# Patient Record
Sex: MALE | Race: WHITE | Employment: OTHER | URBAN - METROPOLITAN AREA
[De-identification: names, ages, dates, MRNs, and addresses within clinical notes are randomized per-mention and may not be internally consistent; named-entity substitution may affect disease eponyms.]

---

## 2017-11-28 ENCOUNTER — APPOINTMENT (OUTPATIENT)
Dept: RADIOLOGY | Facility: CLINIC | Age: 67
End: 2017-11-28
Payer: MEDICARE

## 2017-11-28 ENCOUNTER — TRANSCRIBE ORDERS (OUTPATIENT)
Dept: RADIOLOGY | Facility: CLINIC | Age: 67
End: 2017-11-28

## 2017-11-28 DIAGNOSIS — M25.552 PAIN IN LEFT HIP: Primary | ICD-10-CM

## 2017-11-28 DIAGNOSIS — M25.552 PAIN IN LEFT HIP: ICD-10-CM

## 2017-11-28 PROCEDURE — 73502 X-RAY EXAM HIP UNI 2-3 VIEWS: CPT

## 2018-10-05 ENCOUNTER — OFFICE VISIT (OUTPATIENT)
Dept: OBGYN CLINIC | Facility: CLINIC | Age: 68
End: 2018-10-05
Payer: MEDICARE

## 2018-10-05 VITALS
SYSTOLIC BLOOD PRESSURE: 130 MMHG | HEART RATE: 61 BPM | DIASTOLIC BLOOD PRESSURE: 82 MMHG | WEIGHT: 184 LBS | HEIGHT: 66 IN | BODY MASS INDEX: 29.57 KG/M2

## 2018-10-05 DIAGNOSIS — M65.331 TRIGGER MIDDLE FINGER OF RIGHT HAND: Primary | ICD-10-CM

## 2018-10-05 DIAGNOSIS — M65.321 TRIGGER FINGER, RIGHT INDEX FINGER: ICD-10-CM

## 2018-10-05 PROCEDURE — 99213 OFFICE O/P EST LOW 20 MIN: CPT | Performed by: ORTHOPAEDIC SURGERY

## 2018-10-05 RX ORDER — VALSARTAN 80 MG/1
TABLET ORAL DAILY
COMMUNITY

## 2018-10-05 NOTE — H&P
CHIEF COMPLAINT:  Chief Complaint   Patient presents with    Right Middle Finger - Pain    Right Index Finger - Pain       SUBJECTIVE:  Henry Case is a 79y o  year old RHD male who presents to the office today for evaluation of his right long finger and right index finger  He notes locking and catching to both his right long and index finger  He is a previous patient of Dr Marla Ann at [facility]  He has had 2 previous CSI into both his right long and right index finger which has not provided him with long term relief in his symptoms  The patient denies any cardiac or pulmonary issues  Denies diabetes  Denies any history of MI, gastric ulcers, kidney or liver issues      PAST MEDICAL HISTORY:  History reviewed  No pertinent past medical history  PAST SURGICAL HISTORY:  Past Surgical History:   Procedure Laterality Date    BACK SURGERY      FOOT SURGERY      HERNIA REPAIR      HIP SURGERY      JOINT REPLACEMENT      KNEE SURGERY         FAMILY HISTORY:  History reviewed  No pertinent family history  SOCIAL HISTORY:  Social History   Substance Use Topics    Smoking status: Never Smoker    Smokeless tobacco: Never Used    Alcohol use No       MEDICATIONS:    Current Outpatient Prescriptions:     Calcium Carb-Cholecalciferol (CALCIUM 1000 + D) 1000-800 MG-UNIT TABS, Take by mouth, Disp: , Rfl:     GLUCOSAMINE-CHONDROITIN DS PO, Take by mouth, Disp: , Rfl:     Multiple Vitamin (MULTI-VITAMIN DAILY PO), Take by mouth, Disp: , Rfl:     Omega-3 Fatty Acids (FISH OIL) 645 MG CAPS, Take by mouth, Disp: , Rfl:     valsartan (DIOVAN) 80 mg tablet, Daily, Disp: , Rfl:     ALLERGIES:  Allergies   Allergen Reactions    Cetirizine Other (See Comments)       REVIEW OF SYSTEMS:  Review of Systems   Constitutional: Negative for chills and fever  HENT: Negative for drooling and sneezing  Eyes: Negative for redness  Respiratory: Negative for cough and wheezing      Gastrointestinal: Negative for nausea and vomiting  Musculoskeletal: Negative for arthralgias, joint swelling and myalgias  Neurological: Negative for weakness and numbness  Psychiatric/Behavioral: Negative for behavioral problems  The patient is not nervous/anxious  VITALS:  Vitals:    10/05/18 1543   BP: 130/82   Pulse: 61       LABS:  HgA1c: No results found for: HGBA1C  BMP: No results found for: GLUCOSE, CALCIUM, NA, K, CO2, CL, BUN, CREATININE    _____________________________________________________  PHYSICAL EXAMINATION:  General: well developed and well nourished, alert, oriented times 3 and appears comfortable  Psychiatric: Normal  HEENT: Trachea Midline, No torticollis   Skin: No masses, erthema, lacerations, fluctation, ulcerations  Neurovascular: Sensation Intact to the Median, Ulnar, Radial Nerve, Motor Intact to the Median, Ulnar, Radial Nerve and Pulses Intact   Cardiac:  Regular rate and rhythm  Pulmonary: No respiratory distress  Lung sounds clear to auscultation  MUSCULOSKELETAL EXAMINATION:    right Index finger:  Positive palpable nodule over the A1 pulley  Positive tenderness to palpation over A1 pulley  Positive catching  Positive clicking  right long finger:  Positive palpable nodule over the A1 pulley  Positive tenderness to palpation over A1 pulley  To swollen test catching  To swollen to test clicking         ___________________________________________________  STUDIES REVIEWED:  No studies reviewed  PROCEDURES PERFORMED:  Procedures  No Procedures performed today    _____________________________________________________  ASSESSMENT/PLAN:    Right long and index finger trigger finger   * Patient elected to proceed with right long and right index finger trigger release   *Surgery medication instructions: You will stop eating and drinking at midnight the night before your surgery, but you may continue to take your normal medications with a small sip of water      In the morning on the day of your surgery, we would like you to take the following medications (as long as you have never been told to avoid Tylenol or NSAIDs like ibuprofen, Naproxen, Aleve, Advil, etc):   Ibuprofen 600mg one tablet by mouth   Tylenol 500mg one tablet by mouth    After surgery, we would like you to take Ibuprofen 600mg one tablet by mouth every 6 hours with food (at breakfast, lunch and dinner)  AND Tylenol 500 mg one tablet by mouth every 6 hours  (at breakfast, lunch and dinner) for 5-7 days after your surgery  Please take these medication EVERYDAY after surgery for 5-7 days, and not just as needed  You can take these medications at the same time  Taking these medications after surgery will limit your need for prescription pain medication  We will also prescribe a narcotic pain medication for a limited time after surgery that you can take as needed for moderate or severe pain  Diagnoses and all orders for this visit:    Trigger middle finger of right hand  -     Case request operating room: RELEASE TRIGGER FINGER right long and index; Standing  -     Case request operating room: RELEASE TRIGGER FINGER right long and index    Trigger finger, right index finger  -     Case request operating room: RELEASE TRIGGER FINGER right long and index; Standing  -     Case request operating room: RELEASE TRIGGER FINGER right long and index    Other orders  -     valsartan (DIOVAN) 80 mg tablet; Daily  -     Omega-3 Fatty Acids (FISH OIL) 645 MG CAPS; Take by mouth  -     Calcium Carb-Cholecalciferol (CALCIUM 1000 + D) 1000-800 MG-UNIT TABS; Take by mouth  -     GLUCOSAMINE-CHONDROITIN DS PO; Take by mouth  -     Multiple Vitamin (MULTI-VITAMIN DAILY PO); Take by mouth  -     Void on call to OR; Standing  -     Insert peripheral IV; Standing  -     Diet NPO; Sips with meds; Standing        Follow Up:  No Follow-up on file        To Do Next Visit:  Re-evaluation of current issue and Sutures out      Operative Discussions:  Trigger Finger Release: The anatomy and physiology of trigger finger was discussed with the patient today in the office  Edema and increased contact pressure within the flexor tendons at the A1 pulley can cause pain, crepitation, and limitation of function  Treatment options include resting MP blocking splints to decrease edema, oral anti-inflammatory medications, home or formal therapy exercises, up to 2 steroid injections or surgical release  While majority of patients do respond to conservative treatment, up to 20% may require surgical release  The patient has elected release of the trigger finger  The patient has elected to undergo a release of the A1 pulley (trigger finger)  A small incision will be made over the palmar aspect of the hand, the tendon sheath holding the flexor tendons will be released  In the postoperative period, light activities are allowed immediately, driving is allowed when narcotic medication has stopped, and the incision may get wet after 2 days  Heavy activities (lifting more than approximately 10 pounds) will be allowed after the follow up appointment in 1-2 weeks  While the pain and discomfort within the wrist typically improves rapidly, some residual discomfort may be present for up to 6 weeks  The nodule that is typically palpable in the palmar aspect of the hand will not be removed, as this would necessitate removal of a portion of the flexor tendon, however the catching, clicking, and locking should resolve  Approximate success rate is 98%  The risks and benefits of the procedure were explained to the patient, which include, but are not limited to: Bleeding, infection, recurrence, pain, scar, damage to tendons, damage to nerves, and damage to blood vessels, need for future surgery and complications related to anesthesia  If bony work is done, risks also include malunion and nonunion    These risks, along with alternative conservative treatment options, and postoperative protocols were voiced back and understood by the patient  All questions were answered to the patient's satisfaction  The patient agrees to comply with a standard postoperative protocol, and is willing to proceed  Education was provided via written and auditory forms  There were no barriers to learning  Written handouts regarding wound care, incision and scar care, and general preoperative information, as well as risks and benefits were provided to the patient      Scribe Attestation    I,:   Mya Malhotra MA am acting as a scribe while in the presence of the attending physician :        I,:   Zoe Mcdonough MD personally performed the services described in this documentation    as scribed in my presence :

## 2018-10-05 NOTE — PATIENT INSTRUCTIONS
Surgery medication instructions: You will stop eating and drinking at midnight the night before your surgery, but you may continue to take your normal medications with a small sip of water  In the morning on the day of your surgery, we would like you to take the following medications (as long as you have never been told to avoid Tylenol or NSAIDs like ibuprofen, Naproxen, Aleve, Advil, etc):   Ibuprofen 600mg one tablet by mouth   Tylenol 500mg one tablet by mouth    After surgery, we would like you to take Ibuprofen 600mg one tablet by mouth every 6 hours with food (at breakfast, lunch and dinner)  AND Tylenol 500 mg one tablet by mouth every 6 hours  (at breakfast, lunch and dinner) for 5-7 days after your surgery  Please take these medication EVERYDAY after surgery for 5-7 days, and not just as needed  You can take these medications at the same time  Taking these medications after surgery will limit your need for prescription pain medication  We will also prescribe a narcotic pain medication for a limited time after surgery that you can take as needed for moderate or severe pain       Please stop supplements 5 days prior to surgery

## 2018-10-05 NOTE — PROGRESS NOTES
CHIEF COMPLAINT:  Chief Complaint   Patient presents with    Right Middle Finger - Pain    Right Index Finger - Pain       SUBJECTIVE:  Henry Case is a 79y o  year old RHD male who presents to the office today for evaluation of his right long finger and right index finger  He notes locking and catching to both his right long and index finger  He is a previous patient of Dr Marla Ann at [address] [place] [address]  He has had 2 previous CSI into both his right long and right index finger which has not provided him with long term relief in his symptoms  The patient denies any cardiac or pulmonary issues  Denies diabetes  Denies any history of MI, gastric ulcers, kidney or liver issues      PAST MEDICAL HISTORY:  History reviewed  No pertinent past medical history  PAST SURGICAL HISTORY:  Past Surgical History:   Procedure Laterality Date    BACK SURGERY      FOOT SURGERY      HERNIA REPAIR      HIP SURGERY      JOINT REPLACEMENT      KNEE SURGERY         FAMILY HISTORY:  History reviewed  No pertinent family history  SOCIAL HISTORY:  Social History   Substance Use Topics    Smoking status: Never Smoker    Smokeless tobacco: Never Used    Alcohol use No       MEDICATIONS:    Current Outpatient Prescriptions:     Calcium Carb-Cholecalciferol (CALCIUM 1000 + D) 1000-800 MG-UNIT TABS, Take by mouth, Disp: , Rfl:     GLUCOSAMINE-CHONDROITIN DS PO, Take by mouth, Disp: , Rfl:     Multiple Vitamin (MULTI-VITAMIN DAILY PO), Take by mouth, Disp: , Rfl:     Omega-3 Fatty Acids (FISH OIL) 645 MG CAPS, Take by mouth, Disp: , Rfl:     valsartan (DIOVAN) 80 mg tablet, Daily, Disp: , Rfl:     ALLERGIES:  Allergies   Allergen Reactions    Cetirizine Other (See Comments)       REVIEW OF SYSTEMS:  Review of Systems   Constitutional: Negative for chills and fever  HENT: Negative for drooling and sneezing  Eyes: Negative for redness  Respiratory: Negative for cough and wheezing      Gastrointestinal: Negative for nausea and vomiting  Musculoskeletal: Negative for arthralgias, joint swelling and myalgias  Neurological: Negative for weakness and numbness  Psychiatric/Behavioral: Negative for behavioral problems  The patient is not nervous/anxious  VITALS:  Vitals:    10/05/18 1543   BP: 130/82   Pulse: 61       LABS:  HgA1c: No results found for: HGBA1C  BMP: No results found for: GLUCOSE, CALCIUM, NA, K, CO2, CL, BUN, CREATININE    _____________________________________________________  PHYSICAL EXAMINATION:  General: well developed and well nourished, alert, oriented times 3 and appears comfortable  Psychiatric: Normal  HEENT: Trachea Midline, No torticollis   Skin: No masses, erthema, lacerations, fluctation, ulcerations  Neurovascular: Sensation Intact to the Median, Ulnar, Radial Nerve, Motor Intact to the Median, Ulnar, Radial Nerve and Pulses Intact   Cardiac:  Regular rate and rhythm  Pulmonary: No respiratory distress  Lung sounds clear to auscultation  MUSCULOSKELETAL EXAMINATION:    right Index finger:  Positive palpable nodule over the A1 pulley  Positive tenderness to palpation over A1 pulley  Positive catching  Positive clicking  right long finger:  Positive palpable nodule over the A1 pulley  Positive tenderness to palpation over A1 pulley  To swollen test catching  To swollen to test clicking         ___________________________________________________  STUDIES REVIEWED:  No studies reviewed  PROCEDURES PERFORMED:  Procedures  No Procedures performed today    _____________________________________________________  ASSESSMENT/PLAN:    Right long and index finger trigger finger   * Patient elected to proceed with right long and right index finger trigger release   *Surgery medication instructions: You will stop eating and drinking at midnight the night before your surgery, but you may continue to take your normal medications with a small sip of water      In the morning on the day of your surgery, we would like you to take the following medications (as long as you have never been told to avoid Tylenol or NSAIDs like ibuprofen, Naproxen, Aleve, Advil, etc):   Ibuprofen 600mg one tablet by mouth   Tylenol 500mg one tablet by mouth    After surgery, we would like you to take Ibuprofen 600mg one tablet by mouth every 6 hours with food (at breakfast, lunch and dinner)  AND Tylenol 500 mg one tablet by mouth every 6 hours  (at breakfast, lunch and dinner) for 5-7 days after your surgery  Please take these medication EVERYDAY after surgery for 5-7 days, and not just as needed  You can take these medications at the same time  Taking these medications after surgery will limit your need for prescription pain medication  We will also prescribe a narcotic pain medication for a limited time after surgery that you can take as needed for moderate or severe pain  Diagnoses and all orders for this visit:    Trigger middle finger of right hand  -     Case request operating room: RELEASE TRIGGER FINGER right long and index; Standing  -     Case request operating room: RELEASE TRIGGER FINGER right long and index    Trigger finger, right index finger  -     Case request operating room: RELEASE TRIGGER FINGER right long and index; Standing  -     Case request operating room: RELEASE TRIGGER FINGER right long and index    Other orders  -     valsartan (DIOVAN) 80 mg tablet; Daily  -     Omega-3 Fatty Acids (FISH OIL) 645 MG CAPS; Take by mouth  -     Calcium Carb-Cholecalciferol (CALCIUM 1000 + D) 1000-800 MG-UNIT TABS; Take by mouth  -     GLUCOSAMINE-CHONDROITIN DS PO; Take by mouth  -     Multiple Vitamin (MULTI-VITAMIN DAILY PO); Take by mouth  -     Void on call to OR; Standing  -     Insert peripheral IV; Standing  -     Diet NPO; Sips with meds; Standing        Follow Up:  No Follow-up on file        To Do Next Visit:  Re-evaluation of current issue and Sutures out      Operative Discussions:  Trigger Finger Release: The anatomy and physiology of trigger finger was discussed with the patient today in the office  Edema and increased contact pressure within the flexor tendons at the A1 pulley can cause pain, crepitation, and limitation of function  Treatment options include resting MP blocking splints to decrease edema, oral anti-inflammatory medications, home or formal therapy exercises, up to 2 steroid injections or surgical release  While majority of patients do respond to conservative treatment, up to 20% may require surgical release  The patient has elected release of the trigger finger  The patient has elected to undergo a release of the A1 pulley (trigger finger)  A small incision will be made over the palmar aspect of the hand, the tendon sheath holding the flexor tendons will be released  In the postoperative period, light activities are allowed immediately, driving is allowed when narcotic medication has stopped, and the incision may get wet after 2 days  Heavy activities (lifting more than approximately 10 pounds) will be allowed after the follow up appointment in 1-2 weeks  While the pain and discomfort within the wrist typically improves rapidly, some residual discomfort may be present for up to 6 weeks  The nodule that is typically palpable in the palmar aspect of the hand will not be removed, as this would necessitate removal of a portion of the flexor tendon, however the catching, clicking, and locking should resolve  Approximate success rate is 98%  The risks and benefits of the procedure were explained to the patient, which include, but are not limited to: Bleeding, infection, recurrence, pain, scar, damage to tendons, damage to nerves, and damage to blood vessels, need for future surgery and complications related to anesthesia  If bony work is done, risks also include malunion and nonunion    These risks, along with alternative conservative treatment options, and postoperative protocols were voiced back and understood by the patient  All questions were answered to the patient's satisfaction  The patient agrees to comply with a standard postoperative protocol, and is willing to proceed  Education was provided via written and auditory forms  There were no barriers to learning  Written handouts regarding wound care, incision and scar care, and general preoperative information, as well as risks and benefits were provided to the patient      Scribe Attestation    I,:   Mya Malhotra MA am acting as a scribe while in the presence of the attending physician :        I,:   Nereida Fortune MD personally performed the services described in this documentation    as scribed in my presence :

## 2018-10-15 RX ORDER — LIDOCAINE HYDROCHLORIDE 10 MG/ML
10 INJECTION, SOLUTION INFILTRATION; PERINEURAL ONCE
Status: CANCELLED | OUTPATIENT
Start: 2018-10-15 | End: 2018-10-15

## 2018-10-17 ENCOUNTER — HOSPITAL ENCOUNTER (OUTPATIENT)
Facility: HOSPITAL | Age: 68
Setting detail: OUTPATIENT SURGERY
Discharge: HOME | End: 2018-10-17
Attending: ORTHOPAEDIC SURGERY | Admitting: ORTHOPAEDIC SURGERY
Payer: MEDICARE

## 2018-10-17 VITALS
TEMPERATURE: 97.4 F | SYSTOLIC BLOOD PRESSURE: 148 MMHG | RESPIRATION RATE: 20 BRPM | OXYGEN SATURATION: 98 % | HEART RATE: 53 BPM | DIASTOLIC BLOOD PRESSURE: 82 MMHG

## 2018-10-17 DIAGNOSIS — M65.331 TRIGGER MIDDLE FINGER OF RIGHT HAND: Primary | ICD-10-CM

## 2018-10-17 DIAGNOSIS — M65.321 TRIGGER FINGER, RIGHT INDEX FINGER: ICD-10-CM

## 2018-10-17 PROCEDURE — 26055 INCISE FINGER TENDON SHEATH: CPT | Performed by: ORTHOPAEDIC SURGERY

## 2018-10-17 RX ORDER — HYDROCODONE BITARTRATE AND ACETAMINOPHEN 5; 325 MG/1; MG/1
1 TABLET ORAL EVERY 6 HOURS PRN
Qty: 10 TABLET | Refills: 0 | Status: SHIPPED | OUTPATIENT
Start: 2018-10-17 | End: 2018-10-25

## 2018-10-17 RX ORDER — LIDOCAINE HYDROCHLORIDE 10 MG/ML
10 INJECTION, SOLUTION EPIDURAL; INFILTRATION; INTRACAUDAL; PERINEURAL ONCE
Status: DISCONTINUED | OUTPATIENT
Start: 2018-10-17 | End: 2018-10-17 | Stop reason: HOSPADM

## 2018-10-17 RX ORDER — MAGNESIUM HYDROXIDE 1200 MG/15ML
LIQUID ORAL AS NEEDED
Status: DISCONTINUED | OUTPATIENT
Start: 2018-10-17 | End: 2018-10-17 | Stop reason: HOSPADM

## 2018-10-17 RX ORDER — LIDOCAINE HYDROCHLORIDE 10 MG/ML
INJECTION, SOLUTION EPIDURAL; INFILTRATION; INTRACAUDAL; PERINEURAL
Status: DISCONTINUED
Start: 2018-10-17 | End: 2018-10-17 | Stop reason: HOSPADM

## 2018-10-17 RX ORDER — LIDOCAINE HYDROCHLORIDE 10 MG/ML
INJECTION, SOLUTION INFILTRATION; PERINEURAL AS NEEDED
Status: DISCONTINUED | OUTPATIENT
Start: 2018-10-17 | End: 2018-10-17 | Stop reason: HOSPADM

## 2018-10-17 RX ORDER — SODIUM BICARBONATE 42 MG/ML
INJECTION, SOLUTION INTRAVENOUS
Status: DISCONTINUED
Start: 2018-10-17 | End: 2018-10-17 | Stop reason: HOSPADM

## 2018-10-17 NOTE — H&P (VIEW-ONLY)
CHIEF COMPLAINT:  Chief Complaint   Patient presents with    Right Middle Finger - Pain    Right Index Finger - Pain       SUBJECTIVE:  Rani Cevallos is a 79y o  year old RHD male who presents to the office today for evaluation of his right long finger and right index finger  He notes locking and catching to both his right long and index finger  He is a previous patient of Dr Valery Menjivar at [facility]  He has had 2 previous CSI into both his right long and right index finger which has not provided him with long term relief in his symptoms  The patient denies any cardiac or pulmonary issues  Denies diabetes  Denies any history of MI, gastric ulcers, kidney or liver issues      PAST MEDICAL HISTORY:  History reviewed  No pertinent past medical history  PAST SURGICAL HISTORY:  Past Surgical History:   Procedure Laterality Date    BACK SURGERY      FOOT SURGERY      HERNIA REPAIR      HIP SURGERY      JOINT REPLACEMENT      KNEE SURGERY         FAMILY HISTORY:  History reviewed  No pertinent family history  SOCIAL HISTORY:  Social History   Substance Use Topics    Smoking status: Never Smoker    Smokeless tobacco: Never Used    Alcohol use No       MEDICATIONS:    Current Outpatient Prescriptions:     Calcium Carb-Cholecalciferol (CALCIUM 1000 + D) 1000-800 MG-UNIT TABS, Take by mouth, Disp: , Rfl:     GLUCOSAMINE-CHONDROITIN DS PO, Take by mouth, Disp: , Rfl:     Multiple Vitamin (MULTI-VITAMIN DAILY PO), Take by mouth, Disp: , Rfl:     Omega-3 Fatty Acids (FISH OIL) 645 MG CAPS, Take by mouth, Disp: , Rfl:     valsartan (DIOVAN) 80 mg tablet, Daily, Disp: , Rfl:     ALLERGIES:  Allergies   Allergen Reactions    Cetirizine Other (See Comments)       REVIEW OF SYSTEMS:  Review of Systems   Constitutional: Negative for chills and fever  HENT: Negative for drooling and sneezing  Eyes: Negative for redness  Respiratory: Negative for cough and wheezing      Gastrointestinal: Negative for nausea and vomiting  Musculoskeletal: Negative for arthralgias, joint swelling and myalgias  Neurological: Negative for weakness and numbness  Psychiatric/Behavioral: Negative for behavioral problems  The patient is not nervous/anxious  VITALS:  Vitals:    10/05/18 1543   BP: 130/82   Pulse: 61       LABS:  HgA1c: No results found for: HGBA1C  BMP: No results found for: GLUCOSE, CALCIUM, NA, K, CO2, CL, BUN, CREATININE    _____________________________________________________  PHYSICAL EXAMINATION:  General: well developed and well nourished, alert, oriented times 3 and appears comfortable  Psychiatric: Normal  HEENT: Trachea Midline, No torticollis   Skin: No masses, erthema, lacerations, fluctation, ulcerations  Neurovascular: Sensation Intact to the Median, Ulnar, Radial Nerve, Motor Intact to the Median, Ulnar, Radial Nerve and Pulses Intact   Cardiac:  Regular rate and rhythm  Pulmonary: No respiratory distress  Lung sounds clear to auscultation  MUSCULOSKELETAL EXAMINATION:    right Index finger:  Positive palpable nodule over the A1 pulley  Positive tenderness to palpation over A1 pulley  Positive catching  Positive clicking  right long finger:  Positive palpable nodule over the A1 pulley  Positive tenderness to palpation over A1 pulley  To swollen test catching  To swollen to test clicking         ___________________________________________________  STUDIES REVIEWED:  No studies reviewed  PROCEDURES PERFORMED:  Procedures  No Procedures performed today    _____________________________________________________  ASSESSMENT/PLAN:    Right long and index finger trigger finger   * Patient elected to proceed with right long and right index finger trigger release   *Surgery medication instructions: You will stop eating and drinking at midnight the night before your surgery, but you may continue to take your normal medications with a small sip of water      In the morning on the day of your surgery, we would like you to take the following medications (as long as you have never been told to avoid Tylenol or NSAIDs like ibuprofen, Naproxen, Aleve, Advil, etc):   Ibuprofen 600mg one tablet by mouth   Tylenol 500mg one tablet by mouth    After surgery, we would like you to take Ibuprofen 600mg one tablet by mouth every 6 hours with food (at breakfast, lunch and dinner)  AND Tylenol 500 mg one tablet by mouth every 6 hours  (at breakfast, lunch and dinner) for 5-7 days after your surgery  Please take these medication EVERYDAY after surgery for 5-7 days, and not just as needed  You can take these medications at the same time  Taking these medications after surgery will limit your need for prescription pain medication  We will also prescribe a narcotic pain medication for a limited time after surgery that you can take as needed for moderate or severe pain  Diagnoses and all orders for this visit:    Trigger middle finger of right hand  -     Case request operating room: RELEASE TRIGGER FINGER right long and index; Standing  -     Case request operating room: RELEASE TRIGGER FINGER right long and index    Trigger finger, right index finger  -     Case request operating room: RELEASE TRIGGER FINGER right long and index; Standing  -     Case request operating room: RELEASE TRIGGER FINGER right long and index    Other orders  -     valsartan (DIOVAN) 80 mg tablet; Daily  -     Omega-3 Fatty Acids (FISH OIL) 645 MG CAPS; Take by mouth  -     Calcium Carb-Cholecalciferol (CALCIUM 1000 + D) 1000-800 MG-UNIT TABS; Take by mouth  -     GLUCOSAMINE-CHONDROITIN DS PO; Take by mouth  -     Multiple Vitamin (MULTI-VITAMIN DAILY PO); Take by mouth  -     Void on call to OR; Standing  -     Insert peripheral IV; Standing  -     Diet NPO; Sips with meds; Standing        Follow Up:  No Follow-up on file        To Do Next Visit:  Re-evaluation of current issue and Sutures out      Operative Discussions:  Trigger Finger Release: The anatomy and physiology of trigger finger was discussed with the patient today in the office  Edema and increased contact pressure within the flexor tendons at the A1 pulley can cause pain, crepitation, and limitation of function  Treatment options include resting MP blocking splints to decrease edema, oral anti-inflammatory medications, home or formal therapy exercises, up to 2 steroid injections or surgical release  While majority of patients do respond to conservative treatment, up to 20% may require surgical release  The patient has elected release of the trigger finger  The patient has elected to undergo a release of the A1 pulley (trigger finger)  A small incision will be made over the palmar aspect of the hand, the tendon sheath holding the flexor tendons will be released  In the postoperative period, light activities are allowed immediately, driving is allowed when narcotic medication has stopped, and the incision may get wet after 2 days  Heavy activities (lifting more than approximately 10 pounds) will be allowed after the follow up appointment in 1-2 weeks  While the pain and discomfort within the wrist typically improves rapidly, some residual discomfort may be present for up to 6 weeks  The nodule that is typically palpable in the palmar aspect of the hand will not be removed, as this would necessitate removal of a portion of the flexor tendon, however the catching, clicking, and locking should resolve  Approximate success rate is 98%  The risks and benefits of the procedure were explained to the patient, which include, but are not limited to: Bleeding, infection, recurrence, pain, scar, damage to tendons, damage to nerves, and damage to blood vessels, need for future surgery and complications related to anesthesia  If bony work is done, risks also include malunion and nonunion    These risks, along with alternative conservative treatment options, and postoperative protocols were voiced back and understood by the patient  All questions were answered to the patient's satisfaction  The patient agrees to comply with a standard postoperative protocol, and is willing to proceed  Education was provided via written and auditory forms  There were no barriers to learning  Written handouts regarding wound care, incision and scar care, and general preoperative information, as well as risks and benefits were provided to the patient      Scribe Attestation    I,:   Mya Malhotra MA am acting as a scribe while in the presence of the attending physician :        I,:   Oliver Rios MD personally performed the services described in this documentation    as scribed in my presence :

## 2018-10-17 NOTE — DISCHARGE INSTRUCTIONS
Post Operative Instructions    You have had surgery on your arm today, please read and follow the information below:  · Elevate your hand above your elbow during the next 24-48 hours to help with swelling  · Place your hand and arm over your head with motion at your shoulder three times a day  · Do not apply any cream/ointment/oil to your incisions including antibiotics  · Do not soak your hands in standing water (dishwater, tubs, Jacuzzi's, pools, etc ) until given permission (typically 2-3 weeks after injury)    Call the office if you notice any:  · Increased numbness or tingling of your hand or fingers that is not relieved with elevation  · Increasing pain that is not controlled with medication  · Difficulty chewing, breathing, swallowing  · Chest pains or shortness of breath  · Fever over 101 4 degrees  Bandage: Remove bandage after 2 days  Motion: Move fingers into a fist 5 times a day, DO NOT move any splinted fingers  Weight bearing status: Avoid heavy lifting (>5 pounds) with the extremity that was operated on until follow up appointment  Normal activities of daily living are OK  Ice: Ice for 10 minutes every hour as needed for swelling x 24 hours  Sling: No sling necessary  Pain medication: A prescription for Norco was provided  Delpha Romberg is a combination pill that also contains Tylenol  If you take Norco, do NOT take Tylenol at the same time  After surgery, we would like you to take Ibuprofen 600mg one tablet by mouth every 6 hours with food (at breakfast, lunch and dinner)  AND Tylenol 500 mg one tablet by mouth every 6 hours  (at breakfast, lunch and dinner) for 5-7 days after your surgery  Please take these medication EVERYDAY after surgery for 5-7 days, and not just as needed  You can take these medications at the same time  Taking these medications after surgery will limit your need for prescription pain medication           Follow-up Appointment: 7-10 days with  Garlan Pallas  Occupational Therapy:       Please call the office if you have any questions or concerns regarding your post-operative care

## 2018-10-17 NOTE — OP NOTE
PATIENT NAME: Vanessa Cortés     MEDICAL RECORD NO:  [id number]    PROCEDURE DATE:  10/17/18    :  1950    SURGEON:  PENELOPE Klein , Ph D     Mary Garg: Colten Peres DIAGNOSIS:  Trigger digit right Index and long Finger     POSTOPERATIVE DIAGNOSIS: Trigger digit right Index and long Finger    PROCEDURE PERFORMED:  Trigger release right Index and long Finger     ANESTHESIA:  local     COMPLICATIONS: None     TOURNIQUET TIME: none    DISPOSITION: Patient was sent to the PACU in stable condition  INDICATIONS: Patient is an 79 y o  male with symptomatic triggering of the right Index and Long Finger  The patient failed conservative management and opted for surgical release  After informing the patient of the risks and benefits of trigger digit release, consent was obtained for surgical intervention  PROCEDURE:  The patient was identified in the preoperative screening area  Consent was signed and verified after identifying the correct operative site  The patient was taken back to the operating room where MAC sedation and local infiltration of the surgical site was performed  The patients right upper extremity was prepped and draped in normal sterile fashion with chlorhexidine solution  A 1 5cm incision was made centered over the A-1 pulley on the palmar surface of the Long Finger  The subcutaneous tissue was dissected bluntly down to the level of the flexor sheath taking care to protect the neurovascular structures  The A-1 pulley was identified and was released from proximal to distal   Once release was completed the tendons were examined and found to be intact  The index finger was then released in a similar manner as above  Following releases, the patient demonstrated flexion and extension of the digits without evidence of triggering    The wounds were then irrigated with copious amounts of saline solution and was closed with nylon suture in an interrupted horizontal mattress fashion  Long finger  Findings: There were no significant longitudinal degenerative tears of the FDS tendon  The synovium appeared normal and was left Undisturbed  The A-1 pulley was moderately thickened  Index finger  Findings: There were no significant longitudinal degenerative tears of the FDS tendon  The synovium appeared normal and was left Undisturbed  The A-1 pulley was significantly thickened  The patient tolerated the procedure well  The wound was dressed in a sterile dressing  The patient was then sent to the PACU in stable condition  As no first assistant was provided by the hospital, it was elected to use a physician's assistant to stabilize the extremity and aid in instrumentation          Danilo Mcgarry MD 10/17/18 12:51 PM

## 2018-10-22 ENCOUNTER — EVALUATION (OUTPATIENT)
Dept: PHYSICAL THERAPY | Facility: CLINIC | Age: 68
End: 2018-10-22
Payer: MEDICARE

## 2018-10-22 DIAGNOSIS — M65.331 TRIGGER MIDDLE FINGER OF RIGHT HAND: Primary | ICD-10-CM

## 2018-10-22 PROCEDURE — G8990 OTHER PT/OT CURRENT STATUS: HCPCS | Performed by: PHYSICAL THERAPIST

## 2018-10-22 PROCEDURE — G8991 OTHER PT/OT GOAL STATUS: HCPCS | Performed by: PHYSICAL THERAPIST

## 2018-10-22 PROCEDURE — 97112 NEUROMUSCULAR REEDUCATION: CPT | Performed by: PHYSICAL THERAPIST

## 2018-10-22 PROCEDURE — 97535 SELF CARE MNGMENT TRAINING: CPT | Performed by: PHYSICAL THERAPIST

## 2018-10-22 PROCEDURE — 97161 PT EVAL LOW COMPLEX 20 MIN: CPT | Performed by: PHYSICAL THERAPIST

## 2018-10-22 PROCEDURE — 97035 APP MDLTY 1+ULTRASOUND EA 15: CPT | Performed by: PHYSICAL THERAPIST

## 2018-10-22 PROCEDURE — 97010 HOT OR COLD PACKS THERAPY: CPT | Performed by: PHYSICAL THERAPIST

## 2018-10-22 PROCEDURE — 97140 MANUAL THERAPY 1/> REGIONS: CPT | Performed by: PHYSICAL THERAPIST

## 2018-10-22 PROCEDURE — 97110 THERAPEUTIC EXERCISES: CPT | Performed by: PHYSICAL THERAPIST

## 2018-10-22 NOTE — PROGRESS NOTES
PT Evaluation  and PT Discharge    Today's date: 10/22/2018  Patient name: Jeronimo Hargrove  : 1950  MRN: [id number]  Referring provider: Elaine Hernandez MD  Dx:   Encounter Diagnosis     ICD-10-CM    1  Trigger middle finger of right hand M65 [id number]                   Assessment    Assessment details: Pt is a 78 YO male presenting to PT with pain, decreased AROM, strength and tolerance to activity  Pt would benefit from skilled intervention to adddress these issues and maximize overall function  Occupation- right  Dominant- right; Involved- right    Goals  ST  Decrease pain to 0-2/10 in 4 weeks            2  Decrease swelling in digits and palm            3  Increase AROM to composite fisting  Goals not met            4  Maintain clean wound and promote healing            5   Provide compression for swelling  LT  Increase functional motion and strength for independence with ADL and self care by DC            2  Ability to RT recreational activity by DC        Subjective Evaluation    History of Present Illness  Date of surgery: 10/17/2018  Mechanism of injury: Pt notes ongoing worsening of symptoms including locking of IF, LF in previous 5 years  Pt had received multiple injections and surgical release of both RF  Pain  Current pain ratin  At best pain ratin  At worst pain ratin  Location: right palm/IF/LF    Treatments  Current treatment: physical therapy  Patient Goals  Patient goals for therapy: decreased edema, decreased pain, increased motion, increased strength, independence with ADLs/IADLs and return to sport/leisure activities          Objective     General Comments     Wrist/Hand Comments  AROM right wrist E/F- 55/75  Digits- IF MP- 0/90; PIP- 10/75; DIP- 0/50             LF MP- 0/70; PIP- 10/85; DIP- 0/50  Strength- Un-assessed  Circumference at MPs- 20 8 cm; IF P1-7 0 cm; LF P1- 7 0 cm  Inspection- sutures in place- no active bleeding      Sensation- intact to LT all digits      Flowsheet Rows      Most Recent Value   PT/OT G-Codes   Current Score  psfs-50   Projected Score  psfs-30   FOTO information reviewed  N/A   Assessment Type  Evaluation   G code set  Other PT/OT Primary   Other PT Primary Current Status ()  CK   Other PT Primary Goal Status ()  CJ          Precautions: avoid aggressive fisting/activities that increase swelling    Daily Treatment Diary     Manual  10/22            STM 15            Coban/tubigrip E                                                       Exercise Diary  10/22            TGE 2/5                                                                                                                                                                                                                                                                       Modalities  10/22            HP/Biphasic 15            US 12            CP 15

## 2018-10-25 ENCOUNTER — OFFICE VISIT (OUTPATIENT)
Dept: OBGYN CLINIC | Facility: CLINIC | Age: 68
End: 2018-10-25

## 2018-10-25 VITALS
BODY MASS INDEX: 29.57 KG/M2 | HEART RATE: 69 BPM | DIASTOLIC BLOOD PRESSURE: 82 MMHG | HEIGHT: 66 IN | SYSTOLIC BLOOD PRESSURE: 148 MMHG | WEIGHT: 184 LBS

## 2018-10-25 DIAGNOSIS — M65.321 TRIGGER FINGER, RIGHT INDEX FINGER: ICD-10-CM

## 2018-10-25 DIAGNOSIS — M65.331 TRIGGER MIDDLE FINGER OF RIGHT HAND: Primary | ICD-10-CM

## 2018-10-25 PROCEDURE — 99024 POSTOP FOLLOW-UP VISIT: CPT | Performed by: ORTHOPAEDIC SURGERY

## 2018-10-25 NOTE — PROGRESS NOTES
SUBJECTIVE:  Marycruz Ramirez is a 79y o  year old male who presents for follow up after surgery for trigger finger release of the right long and index fingers done on  10/17/2018  Today patient has some stiffness of his long and index fingers  Patient denies pain or tringgering  VITALS:  Vitals:    10/25/18 1118   BP: 148/82   Pulse: 69       PHYSICAL EXAMINATION:  General: well developed and well nourished, alert, oriented times 3 and appears comfortable  Psychiatric: Normal    MUSCULOSKELETAL EXAMINATION:  Right hand   Incision: clean and dry intact   Range of Motion:  Stiff range of motion of his right index and long fingers due to scar tissue  Neurovascular status: Neuro intact, good cap refill      STUDIES REVIEWED:  No studies reviewed  PROCEDURES PERFORMED:  Procedures  No Procedures performed today      ASSESSMENT/PLAN:    Status post trigger finger release of the right index and long finger performed 10/17/2018  * sutures removed today without complications  * Patient was advised to take Tylenol and NSAIDs to decrease swelling to help with range of motion  * Patient call the office if he has any questions or concerns      FOLLOW UP:  Return if symptoms worsen or fail to improve        TO DO AT NEXT VISIT:  Re-evaluation of current issue      Scribe Attestation    I,:   Anthony Mcknight am acting as a scribe while in the presence of the attending physician :        I,:   Mike Robles MD personally performed the services described in this documentation    as scribed in my presence :

## 2021-04-12 DIAGNOSIS — Z23 ENCOUNTER FOR IMMUNIZATION: ICD-10-CM

## 2021-05-11 ENCOUNTER — IMMUNIZATIONS (OUTPATIENT)
Dept: FAMILY MEDICINE CLINIC | Facility: HOSPITAL | Age: 71
End: 2021-05-11

## 2021-05-11 DIAGNOSIS — Z23 ENCOUNTER FOR IMMUNIZATION: Primary | ICD-10-CM

## 2021-05-11 PROCEDURE — 0001A SARS-COV-2 / COVID-19 MRNA VACCINE (PFIZER-BIONTECH) 30 MCG: CPT

## 2021-05-11 PROCEDURE — 91300 SARS-COV-2 / COVID-19 MRNA VACCINE (PFIZER-BIONTECH) 30 MCG: CPT

## 2021-06-01 ENCOUNTER — IMMUNIZATIONS (OUTPATIENT)
Dept: FAMILY MEDICINE CLINIC | Facility: HOSPITAL | Age: 71
End: 2021-06-01

## 2021-06-01 DIAGNOSIS — Z23 ENCOUNTER FOR IMMUNIZATION: Primary | ICD-10-CM

## 2021-06-01 PROCEDURE — 91300 SARS-COV-2 / COVID-19 MRNA VACCINE (PFIZER-BIONTECH) 30 MCG: CPT

## 2021-06-01 PROCEDURE — 0002A SARS-COV-2 / COVID-19 MRNA VACCINE (PFIZER-BIONTECH) 30 MCG: CPT

## 2024-09-03 ENCOUNTER — HOSPITAL ENCOUNTER (OUTPATIENT)
Dept: CT IMAGING | Facility: HOSPITAL | Age: 74
Discharge: HOME | End: 2024-09-03
Payer: COMMERCIAL

## 2024-09-03 DIAGNOSIS — I25.10 ATHEROSCLEROTIC HEART DISEASE OF NATIVE CORONARY ARTERY WITHOUT ANGINA PECTORIS: ICD-10-CM

## 2024-09-03 PROCEDURE — 75571 CT HRT W/O DYE W/CA TEST: CPT

## (undated) DEVICE — GLOVE SRG BIOGEL 6.5

## (undated) DEVICE — INTENDED FOR TISSUE SEPARATION, AND OTHER PROCEDURES THAT REQUIRE A SHARP SURGICAL BLADE TO PUNCTURE OR CUT.: Brand: BARD-PARKER ® CARBON RIB-BACK BLADES

## (undated) DEVICE — SUT ETHILON 4-0 PS-2 18 IN 1667H

## (undated) DEVICE — LIGHT HANDLE COVER SLEEVE DISP BLUE STELLAR

## (undated) DEVICE — PADDING CAST 3IN COTTON STRL

## (undated) DEVICE — TUBING SUCTION 5MM X 12 FT

## (undated) DEVICE — GAUZE SPONGES,16 PLY: Brand: CURITY

## (undated) DEVICE — STERILE BETHLEHEM PLASTIC HAND: Brand: CARDINAL HEALTH

## (undated) DEVICE — COBAN 2 IN UNSTERILE

## (undated) DEVICE — NEEDLE 25G X 1 1/2

## (undated) DEVICE — GLOVE SRG BIOGEL 8

## (undated) DEVICE — REM POLYHESIVE ADULT PATIENT RETURN ELECTRODE: Brand: VALLEYLAB

## (undated) DEVICE — CURITY NON-ADHERENT STRIPS: Brand: CURITY

## (undated) DEVICE — DRAPE SHEET THREE QUARTER

## (undated) DEVICE — STRETCH BANDAGE: Brand: CURITY

## (undated) DEVICE — GLOVE INDICATOR PI UNDERGLOVE SZ 6.5 BLUE

## (undated) DEVICE — ACE WRAP 3 IN STERILE